# Patient Record
Sex: MALE | Race: WHITE | Employment: FULL TIME | URBAN - METROPOLITAN AREA
[De-identification: names, ages, dates, MRNs, and addresses within clinical notes are randomized per-mention and may not be internally consistent; named-entity substitution may affect disease eponyms.]

---

## 2018-04-22 ENCOUNTER — OFFICE VISIT (OUTPATIENT)
Dept: URGENT CARE | Facility: URGENT CARE | Age: 28
End: 2018-04-22
Payer: COMMERCIAL

## 2018-04-22 VITALS
WEIGHT: 182.6 LBS | TEMPERATURE: 98.6 F | OXYGEN SATURATION: 97 % | DIASTOLIC BLOOD PRESSURE: 87 MMHG | HEART RATE: 100 BPM | SYSTOLIC BLOOD PRESSURE: 125 MMHG

## 2018-04-22 DIAGNOSIS — J02.0 STREP THROAT: ICD-10-CM

## 2018-04-22 DIAGNOSIS — J11.1 INFLUENZA-LIKE ILLNESS: ICD-10-CM

## 2018-04-22 DIAGNOSIS — R07.0 THROAT PAIN: Primary | ICD-10-CM

## 2018-04-22 LAB
DEPRECATED S PYO AG THROAT QL EIA: ABNORMAL
SPECIMEN SOURCE: ABNORMAL

## 2018-04-22 PROCEDURE — 87880 STREP A ASSAY W/OPTIC: CPT | Performed by: PHYSICIAN ASSISTANT

## 2018-04-22 PROCEDURE — 99203 OFFICE O/P NEW LOW 30 MIN: CPT | Performed by: PHYSICIAN ASSISTANT

## 2018-04-22 RX ORDER — PENICILLIN V POTASSIUM 500 MG/1
500 TABLET, FILM COATED ORAL 2 TIMES DAILY
Qty: 20 TABLET | Refills: 0 | Status: SHIPPED | OUTPATIENT
Start: 2018-04-22 | End: 2021-06-27

## 2018-04-22 NOTE — PROGRESS NOTES
Chief Complaint    Chief Complaint   Patient presents with     Fever     Patient complains of fever, sore throat and body aches        ROWAN Henson is an 27 year old male. presents for evaluation and treatment of sore throat.  Onset 3 days, unchanged since that time. Known Strep exposure: household exposure.    Other symptoms include congestion, fever and body aches.    No cough, shortness of breath, or chest pain.  No abdominal pain or diarrhea.    Patient is eating well with no problems swallowing.  Patient is urinating regularly.     ROS:  Further problem focused system review was otherwise negative.     Respiratory History  no history of pneumonia or bronchitis     Family History   No family history on file.     Problem history  There is no problem list on file for this patient.       Allergies  No Known Allergies     Social History  Social History     Social History     Marital status: Single     Spouse name: N/A     Number of children: N/A     Years of education: N/A     Occupational History     Not on file.     Social History Main Topics     Smoking status: Never Smoker     Smokeless tobacco: Never Used     Alcohol use Not on file     Drug use: Not on file     Sexual activity: Not on file     Other Topics Concern     Not on file     Social History Narrative     No narrative on file        Current Meds  No current outpatient prescriptions on file.    OBJECTIVE     Vital signs noted and reviewed by Fracisco Barber  /87 (BP Location: Left arm, Patient Position: Chair, Cuff Size: Adult Regular)  Pulse 100  Temp 98.6  F (37  C) (Oral)  Wt 182 lb 9.6 oz (82.8 kg)  SpO2 97%     PEFR:  General appearance: healthy, alert and no distress  Ears: R TM - normal: no effusions, no erythema, and normal landmarks, L TM - normal: no effusions, no erythema, and normal landmarks  Eyes: R normal, L normal  Nose: normal  Oropharynx: marked erythema  Neck: supple and no adenopathy  Lungs: normal and clear to  auscultation  Heart: S1, S2 normal, no murmur, click, rub or gallop, regular rate and rhythm  Abdomen: Abdomen soft, non-tender without masses or organomegaly        Labs:     Results for orders placed or performed in visit on 04/22/18   Strep, Rapid Screen   Result Value Ref Range    Specimen Description Throat     Rapid Strep A Screen (A)      POSITIVE: Group A Streptococcal antigen detected by immunoassay.          ASSESSMENT:     (R07.0) Throat pain  (primary encounter diagnosis)  Plan: Strep, Rapid Screen    (R69) Influenza-like illness    (J02.0) Strep throat  Plan: penicillin V potassium (VEETID) 500 MG tablet     PLAN:    Strep screen was positive and communicated to patient.  Rx for Penicillin today.  Symptomatic treatment with fluids, vaporizer, acetaminophen,salt water gargles, and ibuprofen.  Follow up with PCP as needed for persistence, worsening, appearance of new symptoms.  Contagion reviewed.  Out of work/school until feeling better, or no fever without antipyretics for 24 hours.  Patient verbalized understanding and agreed with this plan.        Fracisco Barber  4/22/2018, 11:48 AM

## 2018-04-22 NOTE — NURSING NOTE
Chief Complaint   Patient presents with     Fever     Patient complains of fever, sore throat and body aches        Initial /87 (BP Location: Left arm, Patient Position: Chair, Cuff Size: Adult Regular)  Pulse 100  Temp 98.6  F (37  C) (Oral)  Wt 182 lb 9.6 oz (82.8 kg)  SpO2 97% There is no height or weight on file to calculate BMI.  Medication Reconciliation: complete       Sintia Cavazos

## 2018-04-22 NOTE — PATIENT INSTRUCTIONS
Pharyngitis: Strep (Confirmed)    You have had a positive test for strep throat. Strep throat is a contagious illness. It is spread by coughing, kissing or by touching others after touching your mouth or nose. Symptoms include throat pain that is worse with swallowing, aching all over, headache, and fever. It is treated with antibiotic medicine. This should help you start to feel better in 1 to 2 days.  Home care    Rest at home. Drink plenty of fluids to you won't get dehydrated.    No work or school for the first 2 days of taking the antibiotics. After this time, you will not be contagious. You can then return to school or work if you are feeling better.     Take antibiotic medicine for the full 10 days, even if you feel better. This is very important to ensure the infection is treated. It is also important to prevent medicine-resistant germs from developing. If you were given an antibiotic shot, you don't need any more antibiotics.    You may use acetaminophen or ibuprofen to control pain or fever, unless another medicine was prescribed for this. Talk with your healthcare provider before taking these medicines if you have chronic liver or kidney disease. Also talk with your healthcare provider if you have had a stomach ulcer or GI bleeding.    Throat lozenges or sprays help reduce pain. Gargling with warm saltwater will also reduce throat pain. Dissolve 1/2 teaspoon of salt in 1 glass of warm water. This may be useful just before meals.     Soft foods are OK. Don't eat salty or spicy foods.  Follow-up care  Follow up with your healthcare provider or our staff if you don't get better over the next week.  When to seek medical advice  Call your healthcare provider right away if any of these occur:    Fever of 100.4 F (38 C) or higher, or as directed by your healthcare provider    New or worsening ear pain, sinus pain, or headache    Painful lumps in the back of neck    Stiff neck    Lymph nodes getting larger or  becoming soft in the middle    You can't swallow liquids or you can't open your mouth wide because of throat pain    Signs of dehydration. These include very dark urine or no urine, sunken eyes, and dizziness.    Trouble breathing or noisy breathing    Muffled voice    Rash  Prevention  Here are steps you can take to help prevent an infection:    Keep good hand washing habits.    Don t have close contact with people who have sore throats, colds, or other upper respiratory infections.    Don t smoke, and stay away from secondhand smoke.  Date Last Reviewed: 11/1/2017 2000-2017 [employer]. [address] [place] [address]. All rights reserved. This information is not intended as a substitute for professional medical care. Always follow your healthcare professional's instructions.

## 2018-04-22 NOTE — MR AVS SNAPSHOT
After Visit Summary   4/22/2018    Yasmani Henson    MRN: [id number]           Patient Information     Date Of Birth          1990        Visit Information        Provider Department      4/22/2018 11:15 AM Fracisco Barber PA-C [facility]        Today's Diagnoses     Throat pain    -  1    Influenza-like illness        Strep throat          Care Instructions      Pharyngitis: Strep (Confirmed)    You have had a positive test for strep throat. Strep throat is a contagious illness. It is spread by coughing, kissing or by touching others after touching your mouth or nose. Symptoms include throat pain that is worse with swallowing, aching all over, headache, and fever. It is treated with antibiotic medicine. This should help you start to feel better in 1 to 2 days.  Home care    Rest at home. Drink plenty of fluids to you won't get dehydrated.    No work or school for the first 2 days of taking the antibiotics. After this time, you will not be contagious. You can then return to school or work if you are feeling better.     Take antibiotic medicine for the full 10 days, even if you feel better. This is very important to ensure the infection is treated. It is also important to prevent medicine-resistant germs from developing. If you were given an antibiotic shot, you don't need any more antibiotics.    You may use acetaminophen or ibuprofen to control pain or fever, unless another medicine was prescribed for this. Talk with your healthcare provider before taking these medicines if you have chronic liver or kidney disease. Also talk with your healthcare provider if you have had a stomach ulcer or GI bleeding.    Throat lozenges or sprays help reduce pain. Gargling with warm saltwater will also reduce throat pain. Dissolve 1/2 teaspoon of salt in 1 glass of warm water. This may be useful just before meals.     Soft foods are OK. Don't eat salty or spicy foods.  Follow-up  care  Follow up with your healthcare provider or our staff if you don't get better over the next week.  When to seek medical advice  Call your healthcare provider right away if any of these occur:    Fever of 100.4 F (38 C) or higher, or as directed by your healthcare provider    New or worsening ear pain, sinus pain, or headache    Painful lumps in the back of neck    Stiff neck    Lymph nodes getting larger or becoming soft in the middle    You can't swallow liquids or you can't open your mouth wide because of throat pain    Signs of dehydration. These include very dark urine or no urine, sunken eyes, and dizziness.    Trouble breathing or noisy breathing    Muffled voice    Rash  Prevention  Here are steps you can take to help prevent an infection:    Keep good hand washing habits.    Don t have close contact with people who have sore throats, colds, or other upper respiratory infections.    Don t smoke, and stay away from secondhand smoke.  Date Last Reviewed: 11/1/2017 2000-2017 [employer]. [address] [place] [address]. All rights reserved. This information is not intended as a substitute for professional medical care. Always follow your healthcare professional's instructions.                Follow-ups after your visit        Who to contact     If you have questions or need follow up information about today's clinic visit or your schedule please contact [facility] directly at [phone].  Normal or non-critical lab and imaging results will be communicated to you by MyChart, letter or phone within 4 business days after the clinic has received the results. If you do not hear from us within 7 days, please contact the clinic through MyChart or phone. If you have a critical or abnormal lab result, we will notify you by phone as soon as possible.  Submit refill requests through [employer] or call your pharmacy and they will forward the refill request to us. Please  "allow 3 business days for your refill to be completed.          Additional Information About Your Visit        MyChart Information     [employer] lets you send messages to your doctor, view your test results, renew your prescriptions, schedule appointments and more. To sign up, go to [email or web address] . Click on \"Log in\" on the left side of the screen, which will take you to the Welcome page. Then click on \"Sign up Now\" on the right side of the page.     You will be asked to enter the access code listed below, as well as some personal information. Please follow the directions to create your username and password.     Your access code is: [email or web address]  Expires: 2018 12:05 PM     Your access code will  in 90 days. If you need help or a new code, please call your [place] clinic or [phone].        Care EveryWhere ID     This is your Care EveryWhere ID. This could be used by other organizations to access your [place] medical records  [id number]        Your Vitals Were     Pulse Temperature Pulse Oximetry             100 98.6  F (37  C) (Oral) 97%          Blood Pressure from Last 3 Encounters:   18 125/87    Weight from Last 3 Encounters:   18 182 lb 9.6 oz (82.8 kg)              We Performed the Following     Strep, Rapid Screen          Today's Medication Changes          These changes are accurate as of 18 12:05 PM.  If you have any questions, ask your nurse or doctor.               Start taking these medicines.        Dose/Directions    penicillin V potassium 500 MG tablet   Commonly known as:  VEETID   Used for:  Strep throat   Started by:  Fracisco Barber PA-C        Dose:  500 mg   Take 1 tablet (500 mg) by mouth 2 times daily   Quantity:  20 tablet   Refills:  0            Where to get your medicines      These medications were sent to [id number]  [address] - [address] AT [facility] [address] [place] " [address]     Phone:  [phone]     penicillin V potassium 500 MG tablet                Primary Care Provider Fax #    Physician No Ref-Primary [phone]       No address on file        Equal Access to Services     IAN VANG : Hadii aad ku hadwilfrido Noel, waaugustineda luraul, qaybta kajazminda sindhu, nathaniel fontaine laChacortatone benson. So [facility] [phone].    ATENCIÓN: Si habla español, tiene a souza disposición servicios gratuitos de asistencia lingüística. Llame al [phone].    We comply with applicable federal civil rights laws and [place] laws. We do not discriminate on the basis of race, color, national origin, age, disability, sex, sexual orientation, or gender identity.            Thank you!     Thank you for choosing [facility]  for your care. Our goal is always to provide you with excellent care. Hearing back from our patients is one way we can continue to improve our services. Please take a few minutes to complete the written survey that you may receive in the mail after your visit with us. Thank you!             Your Updated Medication List - Protect others around you: Learn how to safely use, store and throw away your medicines at www.disposemymeds.org.          This list is accurate as of 4/22/18 12:05 PM.  Always use your most recent med list.                   Brand Name Dispense Instructions for use Diagnosis    penicillin V potassium 500 MG tablet    VEETID    20 tablet    Take 1 tablet (500 mg) by mouth 2 times daily    Strep throat

## 2021-06-27 ENCOUNTER — APPOINTMENT (OUTPATIENT)
Dept: CT IMAGING | Facility: CLINIC | Age: 31
End: 2021-06-27
Attending: EMERGENCY MEDICINE
Payer: COMMERCIAL

## 2021-06-27 ENCOUNTER — HOSPITAL ENCOUNTER (EMERGENCY)
Facility: CLINIC | Age: 31
Discharge: HOME | End: 2021-06-27
Attending: EMERGENCY MEDICINE | Admitting: EMERGENCY MEDICINE
Payer: COMMERCIAL

## 2021-06-27 VITALS
HEART RATE: 88 BPM | OXYGEN SATURATION: 99 % | TEMPERATURE: 98.2 F | DIASTOLIC BLOOD PRESSURE: 79 MMHG | SYSTOLIC BLOOD PRESSURE: 122 MMHG | WEIGHT: 185 LBS | RESPIRATION RATE: 18 BRPM

## 2021-06-27 DIAGNOSIS — Z87.19 HISTORY OF ULCERATIVE COLITIS: ICD-10-CM

## 2021-06-27 DIAGNOSIS — R19.7 BLOODY DIARRHEA: ICD-10-CM

## 2021-06-27 DIAGNOSIS — R93.89 ABNORMAL CT SCAN: ICD-10-CM

## 2021-06-27 DIAGNOSIS — R10.84 ABDOMINAL PAIN, GENERALIZED: ICD-10-CM

## 2021-06-27 LAB
ANION GAP SERPL CALCULATED.3IONS-SCNC: 8 MMOL/L (ref 3–14)
BASOPHILS # BLD AUTO: 0.1 10E9/L (ref 0–0.2)
BASOPHILS NFR BLD AUTO: 0.8 %
BUN SERPL-MCNC: 16 MG/DL (ref 7–30)
CALCIUM SERPL-MCNC: 9.6 MG/DL (ref 8.5–10.1)
CHLORIDE SERPL-SCNC: 103 MMOL/L (ref 94–109)
CO2 SERPL-SCNC: 26 MMOL/L (ref 20–32)
CREAT SERPL-MCNC: 1.11 MG/DL (ref 0.66–1.25)
DIFFERENTIAL METHOD BLD: NORMAL
EOSINOPHIL # BLD AUTO: 0.5 10E9/L (ref 0–0.7)
EOSINOPHIL NFR BLD AUTO: 4.4 %
ERYTHROCYTE [DISTWIDTH] IN BLOOD BY AUTOMATED COUNT: 12.4 % (ref 10–15)
GFR SERPL CREATININE-BSD FRML MDRD: 89 ML/MIN/{1.73_M2}
GLUCOSE SERPL-MCNC: 70 MG/DL (ref 70–99)
HCT VFR BLD AUTO: 46.7 % (ref 40–53)
HGB BLD-MCNC: 15.9 G/DL (ref 13.3–17.7)
IMM GRANULOCYTES # BLD: 0 10E9/L (ref 0–0.4)
IMM GRANULOCYTES NFR BLD: 0.3 %
LYMPHOCYTES # BLD AUTO: 2.3 10E9/L (ref 0.8–5.3)
LYMPHOCYTES NFR BLD AUTO: 21.9 %
MCH RBC QN AUTO: 29.3 PG (ref 26.5–33)
MCHC RBC AUTO-ENTMCNC: 34 G/DL (ref 31.5–36.5)
MCV RBC AUTO: 86 FL (ref 78–100)
MONOCYTES # BLD AUTO: 1.2 10E9/L (ref 0–1.3)
MONOCYTES NFR BLD AUTO: 11 %
NEUTROPHILS # BLD AUTO: 6.5 10E9/L (ref 1.6–8.3)
NEUTROPHILS NFR BLD AUTO: 61.6 %
NRBC # BLD AUTO: 0 10*3/UL
NRBC BLD AUTO-RTO: 0 /100
PLATELET # BLD AUTO: 176 10E9/L (ref 150–450)
POTASSIUM SERPL-SCNC: 3.7 MMOL/L (ref 3.4–5.3)
RBC # BLD AUTO: 5.42 10E12/L (ref 4.4–5.9)
SODIUM SERPL-SCNC: 137 MMOL/L (ref 133–144)
WBC # BLD AUTO: 10.6 10E9/L (ref 4–11)

## 2021-06-27 PROCEDURE — 99284 EMERGENCY DEPT VISIT MOD MDM: CPT | Performed by: EMERGENCY MEDICINE

## 2021-06-27 PROCEDURE — 80048 BASIC METABOLIC PNL TOTAL CA: CPT | Performed by: FAMILY MEDICINE

## 2021-06-27 PROCEDURE — 250N000009 HC RX 250: Performed by: EMERGENCY MEDICINE

## 2021-06-27 PROCEDURE — 99285 EMERGENCY DEPT VISIT HI MDM: CPT | Mod: 25 | Performed by: EMERGENCY MEDICINE

## 2021-06-27 PROCEDURE — 258N000003 HC RX IP 258 OP 636: Performed by: FAMILY MEDICINE

## 2021-06-27 PROCEDURE — 96361 HYDRATE IV INFUSION ADD-ON: CPT | Performed by: EMERGENCY MEDICINE

## 2021-06-27 PROCEDURE — 74177 CT ABD & PELVIS W/CONTRAST: CPT

## 2021-06-27 PROCEDURE — 96360 HYDRATION IV INFUSION INIT: CPT | Mod: 59 | Performed by: EMERGENCY MEDICINE

## 2021-06-27 PROCEDURE — 250N000011 HC RX IP 250 OP 636: Performed by: EMERGENCY MEDICINE

## 2021-06-27 PROCEDURE — 85025 COMPLETE CBC W/AUTO DIFF WBC: CPT | Performed by: FAMILY MEDICINE

## 2021-06-27 RX ORDER — PREDNISONE 5 MG/1
30 TABLET ORAL DAILY
Qty: 42 TABLET | Refills: 0 | Status: SHIPPED | OUTPATIENT
Start: 2021-07-20 | End: 2021-07-27

## 2021-06-27 RX ORDER — PREDNISONE 5 MG/1
5 TABLET ORAL DAILY
Qty: 7 TABLET | Refills: 0 | Status: SHIPPED | OUTPATIENT
Start: 2021-08-29 | End: 2021-09-05

## 2021-06-27 RX ORDER — PREDNISONE 5 MG/1
10 TABLET ORAL DAILY
Qty: 14 TABLET | Refills: 0 | Status: SHIPPED | OUTPATIENT
Start: 2021-08-21 | End: 2021-08-28

## 2021-06-27 RX ORDER — IOPAMIDOL 755 MG/ML
91 INJECTION, SOLUTION INTRAVASCULAR ONCE
Status: COMPLETED | OUTPATIENT
Start: 2021-06-27 | End: 2021-06-27

## 2021-06-27 RX ORDER — MESALAMINE 4 G/60ML
4 SUSPENSION RECTAL AT BEDTIME
COMMUNITY
Start: 2021-06-21

## 2021-06-27 RX ORDER — PREDNISONE 5 MG/1
35 TABLET ORAL DAILY
Qty: 49 TABLET | Refills: 0 | Status: SHIPPED | OUTPATIENT
Start: 2021-07-12 | End: 2021-07-19

## 2021-06-27 RX ORDER — PREDNISONE 5 MG/1
15 TABLET ORAL DAILY
Qty: 21 TABLET | Refills: 0 | Status: SHIPPED | OUTPATIENT
Start: 2021-08-13 | End: 2021-08-20

## 2021-06-27 RX ORDER — PREDNISONE 20 MG/1
40 TABLET ORAL DAILY
Qty: 28 TABLET | Refills: 0 | Status: SHIPPED | OUTPATIENT
Start: 2021-06-27 | End: 2021-07-11

## 2021-06-27 RX ORDER — PREDNISONE 20 MG/1
20 TABLET ORAL DAILY
Qty: 7 TABLET | Refills: 0 | Status: SHIPPED | OUTPATIENT
Start: 2021-08-05 | End: 2021-08-12

## 2021-06-27 RX ORDER — PREDNISONE 5 MG/1
25 TABLET ORAL DAILY
Qty: 35 TABLET | Refills: 0 | Status: SHIPPED | OUTPATIENT
Start: 2021-07-28 | End: 2021-08-04

## 2021-06-27 RX ADMIN — SODIUM CHLORIDE 63 ML: 9 INJECTION, SOLUTION INTRAVENOUS at 17:15

## 2021-06-27 RX ADMIN — IOPAMIDOL 91 ML: 755 INJECTION, SOLUTION INTRAVENOUS at 17:14

## 2021-06-27 RX ADMIN — SODIUM CHLORIDE 1000 ML: 9 INJECTION, SOLUTION INTRAVENOUS at 15:37

## 2021-06-27 ASSESSMENT — ENCOUNTER SYMPTOMS
EYES NEGATIVE: 1
MUSCULOSKELETAL NEGATIVE: 1
DIARRHEA: 1
RESPIRATORY NEGATIVE: 1
NAUSEA: 1
BLOOD IN STOOL: 1
PSYCHIATRIC NEGATIVE: 1
NEUROLOGICAL NEGATIVE: 1
ALLERGIC/IMMUNOLOGIC NEGATIVE: 1
CARDIOVASCULAR NEGATIVE: 1
CONSTITUTIONAL NEGATIVE: 1
ENDOCRINE NEGATIVE: 1
ABDOMINAL PAIN: 1
HEMATOLOGIC/LYMPHATIC NEGATIVE: 1

## 2021-06-27 NOTE — ED TRIAGE NOTES
hx of ulcerative colitis. pt was changed to row[place] (enema) one week ago and his symptoms are not improving. pt has cramping, diarrhea and blood. pt said he can't wait for the clinic to get back to him because he needs to travel for work.

## 2021-06-27 NOTE — ED NOTES
Pt has hx of ulcerative colitis, diagnosed 7 years ago. Pt says sx have gotten worse since using the medication enema, says he has been drinking enough fluid until today, ate last yesterday morning as food increases his lower abdominal cramping. Pt has bloody stools.

## 2021-06-27 NOTE — ED PROVIDER NOTES
History     Chief Complaint   Patient presents with     Abdominal Pain     hx of ulcerative colitis. pt was changed to rowassa (enema) one week ago and his symptoms are not improving. pt has cramping, diarrhea and blood. pt said he can't wait for the clinic to get back to him because he needs to travel for work.      HPI  Yasmani Henson is a 30 year old male who presents with report of bloody diarrhea and abdominal pain.  Patient has a medical diagnosis of ulcerative colitis.  Patient arrived by car reporting that he was recently switched to Rowasso from mesalamine about a week ago by his GI care team.  Patient reports he does not feel that the Rowasa was helping as he is continued to have increasing a progressive bloody diarrhea although not significant with crampy abdominal pain.  Patient reports typically when he has an ulcerative colitis flare prednisone is often prescribed.  He reports has been in contact electronic health record with [facility] team but has not been happy with the turn around time for response. Patient reports he is followed by Beverly Lr- with [facility] typically goes to variety of the clinic locations.  He reports he had no fever or chills.  He is not on any other medications.  He does not smoke.  He has no back pain.  He reports he is not been able to eat much due to some crampy discomfort.  Because he is not been able to have reliable contact with his care team.  With gastroenterology he is here in the emergency department for further care.  Patient reports no prior history of abdominal surgeries.    Allergies:  No Known Allergies    Problem List:    There are no active problems to display for this patient.       Past Medical History:    No past medical history on file.    Past Surgical History:    No past surgical history on file.    Family History:    No family history on file.    Social History:  Marital Status:  Single [1]  Social History     Tobacco Use     Smoking status: Never Smoker      Smokeless tobacco: Never Used   Substance Use Topics     Alcohol use: Not on file     Drug use: Not on file        Medications:    mesalamine (ROWASA) 4 g enema  predniSONE (DELTASONE) 20 MG tablet  [START ON 8/5/2021] predniSONE (DELTASONE) 20 MG tablet  [START ON 7/12/2021] predniSONE (DELTASONE) 5 MG tablet  [START ON 7/20/2021] predniSONE (DELTASONE) 5 MG tablet  [START ON 7/28/2021] predniSONE (DELTASONE) 5 MG tablet  [START ON 8/13/2021] predniSONE (DELTASONE) 5 MG tablet  [START ON 8/21/2021] predniSONE (DELTASONE) 5 MG tablet  [START ON 8/29/2021] predniSONE (DELTASONE) 5 MG tablet          Review of Systems   Constitutional: Negative.    HENT: Negative.    Eyes: Negative.    Respiratory: Negative.    Cardiovascular: Negative.    Gastrointestinal: Positive for abdominal pain, blood in stool, diarrhea and nausea.   Endocrine: Negative.    Genitourinary: Negative.    Musculoskeletal: Negative.    Skin: Negative.    Allergic/Immunologic: Negative.    Neurological: Negative.    Hematological: Negative.    Psychiatric/Behavioral: Negative.    All other systems reviewed and are negative.      Physical Exam   BP: 119/80  Pulse: 95  Temp: 97.8  F (36.6  C)  Resp: 18  Weight: 83.9 kg (185 lb)  SpO2: 96 %      Physical Exam  Constitutional:       General: He is not in acute distress.     Appearance: He is not toxic-appearing or diaphoretic.   HENT:      Head: Normocephalic and atraumatic.   Eyes:      Extraocular Movements: Extraocular movements intact.      Pupils: Pupils are equal, round, and reactive to light.   Cardiovascular:      Rate and Rhythm: Normal rate and regular rhythm.      Heart sounds: Normal heart sounds.   Pulmonary:      Effort: Pulmonary effort is normal.      Breath sounds: Normal breath sounds.   Abdominal:      General: There is no distension.      Palpations: Abdomen is soft.      Tenderness: There is generalized abdominal tenderness and tenderness in the left lower quadrant.       Skin:      Capillary Refill: Capillary refill takes less than 2 seconds.   Neurological:      General: No focal deficit present.      Mental Status: He is alert and oriented to person, place, and time.   Psychiatric:         Mood and Affect: Mood normal. Mood is not anxious or depressed.         Behavior: Behavior normal.         ED Course        Procedures               Critical Care time:  none             ED medications:  Medications   0.9% sodium chloride BOLUS (1,000 mLs Intravenous New Bag 6/27/21 1537)   iopamidol (ISOVUE-370) solution 91 mL (91 mLs Intravenous Given 6/27/21 1714)   sodium chloride 0.9 % bag 500mL for CT scan flush use (63 mLs Intravenous Given 6/27/21 1715)         ED vitals:  Vitals:    06/27/21 1545 06/27/21 1749 06/27/21 1800 06/27/21 1900   BP: 119/88  105/72 122/79   Pulse:   91 88   Resp:       Temp:  98.6  F (37  C)  99  F (37.2  C)   TempSrc:  Oral  Oral   SpO2:   96% 99%   Weight:         ED labs and imaging:    Results for orders placed or performed during the hospital encounter of 06/27/21 (from the past 24 hour(s))   CBC with platelets, differential   Result Value Ref Range    WBC 10.6 4.0 - 11.0 10e9/L    RBC Count 5.42 4.4 - 5.9 10e12/L    Hemoglobin 15.9 13.3 - 17.7 g/dL    Hematocrit 46.7 40.0 - 53.0 %    MCV 86 78 - 100 fl    MCH 29.3 26.5 - 33.0 pg    MCHC 34.0 31.5 - 36.5 g/dL    RDW 12.4 10.0 - 15.0 %    Platelet Count 176 150 - 450 10e9/L    Diff Method Automated Method     % Neutrophils 61.6 %    % Lymphocytes 21.9 %    % Monocytes 11.0 %    % Eosinophils 4.4 %    % Basophils 0.8 %    % Immature Granulocytes 0.3 %    Nucleated RBCs 0 0 /100    Absolute Neutrophil 6.5 1.6 - 8.3 10e9/L    Absolute Lymphocytes 2.3 0.8 - 5.3 10e9/L    Absolute Monocytes 1.2 0.0 - 1.3 10e9/L    Absolute Eosinophils 0.5 0.0 - 0.7 10e9/L    Absolute Basophils 0.1 0.0 - 0.2 10e9/L    Abs Immature Granulocytes 0.0 0 - 0.4 10e9/L    Absolute Nucleated RBC 0.0    Basic metabolic panel   Result Value Ref  Range    Sodium 137 133 - 144 mmol/L    Potassium 3.7 3.4 - 5.3 mmol/L    Chloride 103 94 - 109 mmol/L    Carbon Dioxide 26 20 - 32 mmol/L    Anion Gap 8 3 - 14 mmol/L    Glucose 70 70 - 99 mg/dL    Urea Nitrogen 16 7 - 30 mg/dL    Creatinine 1.11 0.66 - 1.25 mg/dL    GFR Estimate 89 >60 mL/min/[1.73_m2]    GFR Estimate If Black >90 >60 mL/min/[1.73_m2]    Calcium 9.6 8.5 - 10.1 mg/dL   CT Abdomen Pelvis w Contrast    Narrative    EXAM: CT ABDOMEN PELVIS W CONTRAST  LOCATION: [facility]  DATE/TIME: 6/27/2021 5:11 PM    INDICATION: Abdominal pain, acute, nonlocalized  COMPARISON: None.  TECHNIQUE: CT scan of the abdomen and pelvis was performed following injection of IV contrast. Multiplanar reformats were obtained. Dose reduction techniques were used.  CONTRAST: 91mL Isovue-370    FINDINGS:   LOWER CHEST: Normal.    HEPATOBILIARY: Normal.    PANCREAS: Normal.    SPLEEN: Normal.    ADRENAL GLANDS: Normal.    KIDNEYS/BLADDER: Normal.    BOWEL: There is diffuse colonic wall thickening with adjacent inflammatory change. Normal appendix. Normal terminal ileum. No abscess.    LYMPH NODES: Normal.    VASCULATURE: Unremarkable.    PELVIC ORGANS: Normal.    MUSCULOSKELETAL: Presumed bone islands.      Impression    IMPRESSION:   1.  Evidence for a pancolitis likely infectious or inflammatory. Differential would include ulcerative colitis.           Assessments & Plan (with Medical Decision Making)   Assessment Summary and Clinical Impression: 30-year-old male who presented with subacute crampy abdominal pain with bloody diarrhea within the last week.. Pain is due to  colitis flare with pancolitis on imaging today.  Patient is discharged home after consultation with his treating gastroenterology team with a prednisone taper over the next 3 months with plan for urgent follow-up with the care team in the morning.    Patient reports he was diagnosed with ulcerative colitis about 8 years ago.  Recent medication  change on mesalamine enema.  He was afebrile and hemodynamically normal.  His labs were reassuring.  After discussion with his treating gastroenterology team plan is for prolonged steroid taper with  medication management in the morning.    ED course and Plan:  Reviewed the medical record.  Labs obtained per nursing protocol.  We discussed options for managing his ulcerative colitis flare.  I tried to reach his outpatient GI care team- ( Beverly Caldera with [facility]).  With crampy abdominal pain and some report of tenesmus with bloody stools although he has no history of ulcerative colitis CT imaging was obtained to exclude an abscess or other acute intra-abdominal process.  His work-up was reassuring today including normal electrolytes and normal hemogram.  CT imaging with contrast revealed pancolitis infectious or inflammatory and with underlying history also colitis.  I spoke with Shelton- on-call [facility] at 7.20pm.  We discussed pancolitis on CT and he advised starting patient on prednisone 40 mg daily for 2 weeks and then taper down by 5 mg weekly.  He reported that he would have patient's primary treating provider contact and follow-up the patient in the morning.  With CT findings and patient symptoms reported we discussed the plan for managing his flare.  Reviewed reasons to return to the plan to reevaluate patient expressed comfort, understanding and agreement plan of care.      Disclaimer: This note consists of symbols derived from keyboarding, dictation and/or voice recognition software. As a result, there may be errors in the script that have gone undetected. Please consider this when interpreting information found in this chart.  I have reviewed the nursing notes.    I have reviewed the findings, diagnosis, plan and need for follow up with the patient.       New Prescriptions    PREDNISONE (DELTASONE) 20 MG TABLET    Take 2 tablets (40 mg) by mouth daily for 14 days    PREDNISONE (DELTASONE) 20 MG TABLET     Take 1 tablet (20 mg) by mouth daily for 7 days    PREDNISONE (DELTASONE) 5 MG TABLET    Take 7 tablets (35 mg) by mouth daily for 7 days    PREDNISONE (DELTASONE) 5 MG TABLET    Take 6 tablets (30 mg) by mouth daily for 7 days    PREDNISONE (DELTASONE) 5 MG TABLET    Take 5 tablets (25 mg) by mouth daily for 7 days    PREDNISONE (DELTASONE) 5 MG TABLET    Take 3 tablets (15 mg) by mouth daily for 7 days    PREDNISONE (DELTASONE) 5 MG TABLET    Take 2 tablets (10 mg) by mouth daily for 7 days    PREDNISONE (DELTASONE) 5 MG TABLET    Take 1 tablet (5 mg) by mouth daily for 7 days       Final diagnoses:   Abdominal pain, generalized - Crampy discomfort since starting Rowasa   Bloody diarrhea - Acute on chronic.  With underlying history of ulcerative colitis   History of ulcerative colitis   Abnormal CT scan - Pancolitis       6/27/2021   [facility] EMERGENCY DEPT     Soham Kay MD  06/28/21 0108

## 2021-06-27 NOTE — DISCHARGE INSTRUCTIONS
1) Your patient today did not suggest any significant blood loss from fluid he also colitis with report of bloody diarrhea and imaging did not show any catastrophe with crampy pain.    2) I reviewed imaging and your evaluation with your care team with [facility].  Prednisone taper has been recommended.  Plans for you to take 40 mg daily for 2 weeks and then taper down by 5 mg weekly until your completed your treatment course.   Called in the morning by the care team for additional medication management.

## 2024-01-10 ENCOUNTER — OFFICE VISIT (OUTPATIENT)
Dept: URGENT CARE | Facility: URGENT CARE | Age: 34
End: 2024-01-10
Payer: COMMERCIAL

## 2024-01-10 VITALS
HEART RATE: 105 BPM | SYSTOLIC BLOOD PRESSURE: 129 MMHG | WEIGHT: 176 LBS | DIASTOLIC BLOOD PRESSURE: 78 MMHG | OXYGEN SATURATION: 97 % | RESPIRATION RATE: 17 BRPM | TEMPERATURE: 98.1 F

## 2024-01-10 DIAGNOSIS — A08.4 VIRAL GASTROENTERITIS: Primary | ICD-10-CM

## 2024-01-10 DIAGNOSIS — R50.9 FEVER, UNSPECIFIED: ICD-10-CM

## 2024-01-10 DIAGNOSIS — A09 DIARRHEA OF INFECTIOUS ORIGIN: ICD-10-CM

## 2024-01-10 DIAGNOSIS — R11.2 NAUSEA AND VOMITING, UNSPECIFIED VOMITING TYPE: ICD-10-CM

## 2024-01-10 LAB
FLUAV AG SPEC QL IA: NEGATIVE
FLUBV AG SPEC QL IA: NEGATIVE

## 2024-01-10 PROCEDURE — 87804 INFLUENZA ASSAY W/OPTIC: CPT | Performed by: NURSE PRACTITIONER

## 2024-01-10 PROCEDURE — 99203 OFFICE O/P NEW LOW 30 MIN: CPT | Performed by: NURSE PRACTITIONER

## 2024-01-10 PROCEDURE — 87507 IADNA-DNA/RNA PROBE TQ 12-25: CPT | Performed by: NURSE PRACTITIONER

## 2024-01-10 PROCEDURE — 87635 SARS-COV-2 COVID-19 AMP PRB: CPT | Performed by: NURSE PRACTITIONER

## 2024-01-10 RX ORDER — MESALAMINE 1.2 G/1
TABLET, DELAYED RELEASE ORAL
COMMUNITY
Start: 2023-10-21

## 2024-01-10 RX ORDER — VEDOLIZUMAB 300 MG/5ML
INJECTION, POWDER, LYOPHILIZED, FOR SOLUTION INTRAVENOUS
COMMUNITY

## 2024-01-11 ENCOUNTER — TELEPHONE (OUTPATIENT)
Dept: URGENT CARE | Facility: URGENT CARE | Age: 34
End: 2024-01-11
Payer: COMMERCIAL

## 2024-01-11 DIAGNOSIS — R11.0 NAUSEA: Primary | ICD-10-CM

## 2024-01-11 LAB
ADV 40+41 DNA STL QL NAA+NON-PROBE: NEGATIVE
ASTRO TYP 1-8 RNA STL QL NAA+NON-PROBE: NEGATIVE
C CAYETANENSIS DNA STL QL NAA+NON-PROBE: NEGATIVE
CAMPYLOBACTER DNA SPEC NAA+PROBE: NEGATIVE
CRYPTOSP DNA STL QL NAA+NON-PROBE: POSITIVE
E COLI O157 DNA STL QL NAA+NON-PROBE: ABNORMAL
E HISTOLYT DNA STL QL NAA+NON-PROBE: NEGATIVE
EAEC ASTA GENE ISLT QL NAA+PROBE: NEGATIVE
EC STX1+STX2 GENES STL QL NAA+NON-PROBE: NEGATIVE
EPEC EAE GENE STL QL NAA+NON-PROBE: NEGATIVE
ETEC LTA+ST1A+ST1B TOX ST NAA+NON-PROBE: NEGATIVE
G LAMBLIA DNA STL QL NAA+NON-PROBE: NEGATIVE
NOROVIRUS GI+II RNA STL QL NAA+NON-PROBE: NEGATIVE
P SHIGELLOIDES DNA STL QL NAA+NON-PROBE: NEGATIVE
RVA RNA STL QL NAA+NON-PROBE: NEGATIVE
SALMONELLA SP RPOD STL QL NAA+PROBE: NEGATIVE
SAPO I+II+IV+V RNA STL QL NAA+NON-PROBE: POSITIVE
SARS-COV-2 RNA RESP QL NAA+PROBE: NEGATIVE
SHIGELLA SP+EIEC IPAH ST NAA+NON-PROBE: NEGATIVE
V CHOLERAE DNA SPEC QL NAA+PROBE: NEGATIVE
VIBRIO DNA SPEC NAA+PROBE: NEGATIVE
Y ENTEROCOL DNA STL QL NAA+PROBE: NEGATIVE

## 2024-01-11 RX ORDER — ONDANSETRON 4 MG/1
4 TABLET, ORALLY DISINTEGRATING ORAL EVERY 8 HOURS PRN
Qty: 12 TABLET | Refills: 0 | Status: SHIPPED | OUTPATIENT
Start: 2024-01-11

## 2024-01-11 NOTE — TELEPHONE ENCOUNTER
"[facility] Emergency Department/Urgent Care Lab result notification  [Note:  ED Lab Results RN will reference the [facility] Emergency Dept visit note prior to contacting patient AND/OR prior to consulting Emergency Dept Provider.  Highlights of Emergency Dept visit in information summary at the bottom of this telephone note]    1. Reason for call  Notify of lab results  Assess patient symptoms [if necessary]  Review ED Providers recommendations/discharge instructions (if necessary)  Advise per [facility] ED lab result protocol    2. Lab Result (including Rx patient on, if applicable).  If culture, copy of lab report at bottom.  Final Enteric Bacteria and Virus Panel by MACARIO Stool is POSITIVE for CRYPTOSPORIDIUM SPECIES & SAPOVIRUS  Recommendations in treatment per [facility] ED Lab Result Enteric Bacteria and Virus Panel protocol.    3. RN Assessment (Patient's current Symptoms):  Time of call: 1/11/2024 12:36 PM  Assessment: patient currently on Day 4, \"I feel like shit\", says he has exhausted, weakness, feeling kind of faint, he can stand and walk to the bathroom   Fever: \"I don't know, yesterday I didn't, I haven't checked myself today\"  Vomiting/nausea: Nausea, dry heaving, 'nothing to throw up'  Diarrhea:  Yes, \"the same as before\" - 15-20/day \"probably, 30-60 minutes after I drink liquids\", stools are not bloody, stools are liquid for the most part, smaller stool sizes  Hydration: \"probably not as much as I should\", drinking gatorade 'regular red gatarode\", last urinated 2 hours ago, not sure on color of urine  Abdominal pain:  None      4. RN Recommendations/Instructions per [place] ED lab result protocol  [facility] ED lab result protocol used: Enteric bacteria & virus panel  Patient was notified of lab result and treatment recommendations per urgent care consult  RN reviewed information about viral/parasitic organisms, duration, rest, fluids, electrolytes " replacement, dietary choices, avoid antidiarrhea medication, disinfecting - was very specific about utilizing crypto disinfecting recommendations of hot water, soap, sun, and bleach for sapovirus (AVOID  HYDROGEN PEROXIDE AS THIS CAN CAUSE AN EXPLOSION WITH BLEACH), return for any worsening fevers, abdominal pain, intractable vomiting, worsening diarrhea/bloody, dehydration, weak/faint/pale. All questions answered patient verbalized understanding and agrees with plan.  RN will consult with [facility] Emergency Dept Provider and then call patient back with recommendations (Yes/No/NA): YES    RN consultation note with [facility] Emergency Dept Provider   Why consultation necessary, See SBAR below:  S (situation, reason for consult): 33 M, NKA to antibiotics, final enteric bacteria & virus panel cryptosporidium & sapovirus exhibiting symptoms of severe disease/dehydration, seeking treatment recommendations  B (background): see ED note  A (assessment): See RN assessment  R (RN's recommendations): per provider      [facility] Emergency Department Provider: RENALDO Oliveira  Consultation Time: 1/11/2024 12:49 PM  Provider Recommendation:  Provider advised she will review patient chart, results, and give a return call to patient with recommendations  Patient/parent notified of Providers recommendations (YES/NO): YES       5. Please Contact your PCP clinic or return to the Emergency department if your:  Symptoms return.  Symptoms worsen or other concerning symptoms.        Yan Peterson RN  [facility] [employer] [place]  Emergency Dept Lab Result RN  Ph# [phone]

## 2024-01-11 NOTE — TELEPHONE ENCOUNTER
[facility] Urgent Care Lab result notification  [Note:  ED Lab Results RN will reference the [facility] Emergency Dept visit note prior to contacting patient AND/OR prior to consulting Emergency Dept Provider.  Highlights of Emergency Dept visit in information summary at the bottom of this telephone note]    1. Reason for call  Notify of lab results  Assess patient symptoms [if necessary]  Review ED Providers recommendations/discharge instructions (if necessary)  Advise per [facility] ED lab result protocol    2. Lab Result (including Rx patient on, if applicable).  If culture, copy of lab report at bottom.  Final Enteric Bacteria and Virus Panel by MACARIO Stool is POSITIVE for CRYPTOSPORIDIUM SPECIES & SAPOVIRUS  Recommendations in treatment per [facility] ED Lab Result Enteric Bacteria and Virus Panel protocol.    3. RN Assessment (Patient's current Symptoms):  Time of call: 1/11/2024 12:11 PM  Assessment: NKA to antibiotics, patient currently on Day 4 of illness  Left voicemail message requesting a call back to [facility] ED Lab Result RN at [phone].  RN is available every day between 9 a.m. and 5:30 p.m.      Information summary from Emergency Dept/Urgent Care visit on 1/10/24  Allergies No Known Allergies   Weight, if applicable Wt Readings from Last 2 Encounters:   01/10/24 79.8 kg (176 lb)   06/27/21 83.9 kg (185 lb)      Coumadin/Warfarin [Yes /No] NO   Creatinine Level (mg/dl) Creatinine   Date Value Ref Range Status   06/27/2021 1.11 0.66 - 1.25 mg/dL Final      Creatinine clearance (ml/min), if applicable Creatinine clearance cannot be calculated (Patient's most recent lab result is older than the maximum 365 days allowed.)           Yan Peterson, KIM  [facility] [place]  Emergency Dept Lab Result RN  Ph# [phone]

## 2024-01-11 NOTE — LETTER
January 11, 2024        Yasmani Henson  [address] [address]          Dear Yasmani Henson:    You were seen in the [facility] Emergency Department at [facility] on 1/10/2024.  We are unable to reach you by phone, so we are sending you this letter.     It is important that you call [facility] Emergency Department lab result nurse at [phone], as we have information to relay to you AND/OR we MAY have to make some changes in your treatment.    Best time to call back is between 9AM and 5:30PM, 7 days a week.      Sincerely,     [facility] Emergency Department Lab Result RN  [phone]

## 2024-01-11 NOTE — TELEPHONE ENCOUNTER
Called to update patient on his stool results   Patient endorses nausea will order him zofran   Return precautions given   Additionally we discussed if symptoms do not improve after starting today's treatment (or if symptoms worsen) to follow up in next few days.

## 2024-03-20 ENCOUNTER — TELEPHONE (OUTPATIENT)
Dept: FAMILY MEDICINE | Facility: CLINIC | Age: 34
End: 2024-03-20
Payer: COMMERCIAL

## 2024-03-20 NOTE — TELEPHONE ENCOUNTER
"[facility] following up on recent test positive for cryptosporidium. Requested what antibiotics were prescribed and ethnicity.    Ethnicity \"chose not to answer\" and no antibitoics noted given from [facility].    Laney Duong RN  [facility] - Registered Nurse  Clinic Triage [place]   March 20, 2024    "